# Patient Record
Sex: FEMALE | Race: WHITE
[De-identification: names, ages, dates, MRNs, and addresses within clinical notes are randomized per-mention and may not be internally consistent; named-entity substitution may affect disease eponyms.]

---

## 2020-04-10 ENCOUNTER — HOSPITAL ENCOUNTER (OUTPATIENT)
Dept: HOSPITAL 95 - LAB | Age: 75
End: 2020-04-10
Attending: NURSE PRACTITIONER
Payer: MEDICARE

## 2020-04-10 DIAGNOSIS — R30.9: Primary | ICD-10-CM

## 2020-04-10 LAB
LEUKOCYTE ESTERASE UR QL STRIP: (no result)
RBC #/AREA URNS HPF: (no result) /HPF (ref 0–2)
SP GR SPEC: 1.01 (ref 1–1.02)
UROBILINOGEN UR STRIP-MCNC: (no result) MG/DL

## 2020-06-05 ENCOUNTER — HOSPITAL ENCOUNTER (INPATIENT)
Dept: HOSPITAL 95 - ER | Age: 75
LOS: 1 days | DRG: 329 | End: 2020-06-06
Attending: SURGERY | Admitting: SURGERY
Payer: MEDICARE

## 2020-06-05 VITALS — HEIGHT: 67.01 IN | BODY MASS INDEX: 25.92 KG/M2 | WEIGHT: 165.13 LBS

## 2020-06-05 DIAGNOSIS — K65.9: ICD-10-CM

## 2020-06-05 DIAGNOSIS — A41.9: ICD-10-CM

## 2020-06-05 DIAGNOSIS — I50.30: ICD-10-CM

## 2020-06-05 DIAGNOSIS — I95.9: ICD-10-CM

## 2020-06-05 DIAGNOSIS — Z20.828: ICD-10-CM

## 2020-06-05 DIAGNOSIS — M54.5: ICD-10-CM

## 2020-06-05 DIAGNOSIS — E78.5: ICD-10-CM

## 2020-06-05 DIAGNOSIS — I13.0: ICD-10-CM

## 2020-06-05 DIAGNOSIS — N17.9: ICD-10-CM

## 2020-06-05 DIAGNOSIS — E11.22: ICD-10-CM

## 2020-06-05 DIAGNOSIS — M81.0: ICD-10-CM

## 2020-06-05 DIAGNOSIS — N18.3: ICD-10-CM

## 2020-06-05 DIAGNOSIS — I25.9: ICD-10-CM

## 2020-06-05 DIAGNOSIS — F32.9: ICD-10-CM

## 2020-06-05 DIAGNOSIS — E03.9: ICD-10-CM

## 2020-06-05 DIAGNOSIS — D72.819: ICD-10-CM

## 2020-06-05 DIAGNOSIS — R65.21: ICD-10-CM

## 2020-06-05 DIAGNOSIS — J96.01: ICD-10-CM

## 2020-06-05 DIAGNOSIS — K63.1: Primary | ICD-10-CM

## 2020-06-05 DIAGNOSIS — Z79.4: ICD-10-CM

## 2020-06-05 LAB
ALBUMIN SERPL BCP-MCNC: 1.6 G/DL (ref 3.4–5)
ALBUMIN SERPL BCP-MCNC: 3 G/DL (ref 3.4–5)
ALBUMIN/GLOB SERPL: 0.4 {RATIO} (ref 0.8–1.8)
ALBUMIN/GLOB SERPL: 0.5 {RATIO} (ref 0.8–1.8)
ALT SERPL W P-5'-P-CCNC: 21 U/L (ref 12–78)
ALT SERPL W P-5'-P-CCNC: 26 U/L (ref 12–78)
ANION GAP SERPL CALCULATED.4IONS-SCNC: 8 MMOL/L (ref 6–16)
ANION GAP SERPL CALCULATED.4IONS-SCNC: 9 MMOL/L (ref 6–16)
AST SERPL W P-5'-P-CCNC: 27 U/L (ref 12–37)
AST SERPL W P-5'-P-CCNC: 29 U/L (ref 12–37)
BASOPHILS # BLD: 0 K/MM3 (ref 0–0.23)
BASOPHILS # BLD: 0 K/MM3 (ref 0–0.23)
BASOPHILS NFR BLD: 0 % (ref 0–2)
BASOPHILS NFR BLD: 0 % (ref 0–2)
BILIRUB SERPL-MCNC: 0.2 MG/DL (ref 0.1–1)
BILIRUB SERPL-MCNC: 0.2 MG/DL (ref 0.1–1)
BUN SERPL-MCNC: 25 MG/DL (ref 8–24)
BUN SERPL-MCNC: 26 MG/DL (ref 8–24)
CALCIUM SERPL-MCNC: 10.1 MG/DL (ref 8.5–10.1)
CALCIUM SERPL-MCNC: 8.4 MG/DL (ref 8.5–10.1)
CHLORIDE SERPL-SCNC: 113 MMOL/L (ref 98–108)
CHLORIDE SERPL-SCNC: 115 MMOL/L (ref 98–108)
CO2 SERPL-SCNC: 20 MMOL/L (ref 21–32)
CO2 SERPL-SCNC: 21 MMOL/L (ref 21–32)
CREAT SERPL-MCNC: 1.01 MG/DL (ref 0.4–1)
CREAT SERPL-MCNC: 1.51 MG/DL (ref 0.4–1)
DEPRECATED RDW RBC AUTO: 62.7 FL (ref 35.1–46.3)
DEPRECATED RDW RBC AUTO: 67.2 FL (ref 35.1–46.3)
EOSINOPHIL # BLD: 0.01 K/MM3 (ref 0–0.68)
EOSINOPHIL # BLD: 0.02 K/MM3 (ref 0–0.68)
EOSINOPHIL NFR BLD: 1 % (ref 0–6)
EOSINOPHIL NFR BLD: 1 % (ref 0–6)
ERYTHROCYTE [DISTWIDTH] IN BLOOD BY AUTOMATED COUNT: 15.6 % (ref 11.7–14.2)
ERYTHROCYTE [DISTWIDTH] IN BLOOD BY AUTOMATED COUNT: 16.4 % (ref 11.7–14.2)
GLOBULIN SER CALC-MCNC: 3.6 G/DL (ref 2.2–4)
GLOBULIN SER CALC-MCNC: 5.8 G/DL (ref 2.2–4)
GLUCOSE SERPL-MCNC: 109 MG/DL (ref 70–99)
GLUCOSE SERPL-MCNC: 164 MG/DL (ref 70–99)
HCT VFR BLD AUTO: 38.6 % (ref 33–51)
HCT VFR BLD AUTO: 44.1 % (ref 33–51)
HGB BLD-MCNC: 12 G/DL (ref 11.5–16)
HGB BLD-MCNC: 13.8 G/DL (ref 11.5–16)
LYMPHOCYTES # BLD: 0.62 K/MM3 (ref 0.84–5.2)
LYMPHOCYTES # BLD: 0.95 K/MM3 (ref 0.84–5.2)
LYMPHOCYTES NFR BLD: 36 % (ref 21–46)
LYMPHOCYTES NFR BLD: 38 % (ref 21–46)
MAGNESIUM SERPL-MCNC: 1.6 MG/DL (ref 1.6–2.4)
MCHC RBC AUTO-ENTMCNC: 31.1 G/DL (ref 31.5–36.5)
MCHC RBC AUTO-ENTMCNC: 31.3 G/DL (ref 31.5–36.5)
MCV RBC AUTO: 107 FL (ref 80–100)
MCV RBC AUTO: 109 FL (ref 80–100)
METAMYELOCYTES # BLD MANUAL: 0.4 K/MM3 (ref 0–0)
METAMYELOCYTES NFR BLD MANUAL: 26 % (ref 0–0)
MONOCYTES # BLD: 0.07 K/MM3 (ref 0.16–1.47)
MONOCYTES # BLD: 0.1 K/MM3 (ref 0.16–1.47)
MONOCYTES NFR BLD: 4 % (ref 4–13)
MONOCYTES NFR BLD: 5 % (ref 4–13)
NEUTS BAND NFR BLD MANUAL: 18 % (ref 0–8)
NEUTS BAND NFR BLD MANUAL: 21 % (ref 0–8)
NEUTS SEG # BLD MANUAL: 0.43 K/MM3 (ref 1.96–9.15)
NEUTS SEG # BLD MANUAL: 1.55 K/MM3 (ref 1.96–9.15)
NEUTS SEG NFR BLD MANUAL: 41 % (ref 41–73)
NEUTS SEG NFR BLD MANUAL: 7 % (ref 41–73)
NRBC # BLD AUTO: 0 K/MM3 (ref 0–0.02)
NRBC # BLD AUTO: 0.04 K/MM3 (ref 0–0.02)
NRBC BLD AUTO-RTO: 0 /100 WBC (ref 0–0.2)
NRBC BLD AUTO-RTO: 2.6 /100 WBC (ref 0–0.2)
PH BLDA: 7.14 [PH] (ref 7.35–7.45)
PHOSPHATE SERPL-MCNC: 5 MG/DL (ref 2.5–4.9)
PLATELET # BLD AUTO: 201 K/MM3 (ref 150–400)
PLATELET # BLD AUTO: 218 K/MM3 (ref 150–400)
POTASSIUM SERPL-SCNC: 3.9 MMOL/L (ref 3.5–5.5)
POTASSIUM SERPL-SCNC: 4.3 MMOL/L (ref 3.5–5.5)
PROT SERPL-MCNC: 5.2 G/DL (ref 6.4–8.2)
PROT SERPL-MCNC: 8.8 G/DL (ref 6.4–8.2)
PROT UR STRIP-MCNC: (no result) MG/DL
PROTHROMBIN TIME: 12.7 SEC (ref 9.7–11.5)
RBC #/AREA URNS HPF: (no result) /HPF (ref 0–2)
SODIUM SERPL-SCNC: 142 MMOL/L (ref 136–145)
SODIUM SERPL-SCNC: 144 MMOL/L (ref 136–145)
SP GR SPEC: 1.01 (ref 1–1.02)
TOTAL CELLS COUNTED BLD: 100
TOTAL CELLS COUNTED BLD: 100
UROBILINOGEN UR STRIP-MCNC: (no result) MG/DL
VARIANT LYMPHS NFR BLD MANUAL: 2 % (ref 0–0)
WBC #/AREA URNS HPF: (no result) /HPF (ref 0–5)

## 2020-06-05 PROCEDURE — 3E033XZ INTRODUCTION OF VASOPRESSOR INTO PERIPHERAL VEIN, PERCUTANEOUS APPROACH: ICD-10-PCS | Performed by: SURGERY

## 2020-06-05 PROCEDURE — C1751 CATH, INF, PER/CENT/MIDLINE: HCPCS

## 2020-06-05 PROCEDURE — C9113 INJ PANTOPRAZOLE SODIUM, VIA: HCPCS

## 2020-06-05 PROCEDURE — 0DTN0ZZ RESECTION OF SIGMOID COLON, OPEN APPROACH: ICD-10-PCS | Performed by: SURGERY

## 2020-06-05 PROCEDURE — U0002 COVID-19 LAB TEST NON-CDC: HCPCS

## 2020-06-05 PROCEDURE — 5A1935Z RESPIRATORY VENTILATION, LESS THAN 24 CONSECUTIVE HOURS: ICD-10-PCS | Performed by: SURGERY

## 2020-06-05 PROCEDURE — 0D1N0Z4 BYPASS SIGMOID COLON TO CUTANEOUS, OPEN APPROACH: ICD-10-PCS | Performed by: SURGERY

## 2020-06-05 PROCEDURE — 0BH17EZ INSERTION OF ENDOTRACHEAL AIRWAY INTO TRACHEA, VIA NATURAL OR ARTIFICIAL OPENING: ICD-10-PCS | Performed by: SURGERY

## 2020-06-05 NOTE — NUR
DR LAGOS CALLED, REPORTS WILL CALL FAMILY.  REPORTS TO INCREASE IVF RATE
/HR WITH CURRENT BAG IN PLACE AT THIS TIME AND THAT HE WILL BE PLACING
ORDERS.

## 2020-06-05 NOTE — NUR
SHIFT SUMMARY
 
RECEIVED HAND OFF FROM FRANCISCO JAVIER NICHOLS USING SBAR.  TRANSPORTED TO ROOM 217 VIA
STRETCHER.  TRANSFERED TO BED WITH FULL STAFF AASSISTANCE, TOLERATED WELL.
DURING TRANSFER TO BED RIGHT HAND 22G PIV BECAME TWISTED BUT REMAINED
PATENT.  AAO X3, BUT QUICKLY BECOMES DROWSY, SMITH, FOLLOWS ALL COMMANDS.
ORIENTED TO ROOM, CALL SYSTEM, AND POC, VOICES UNDERSTANDING.  STATED THAT SHE
NEEDS TO USE COMMODE, D/T WEAKNESS OFFEERED BEDPAN.  GAVE VERBAL PERMISSION
FOR NURSING TO SPEAK WITH AND UPDATE GABRIELLA ESTEBAN.  WHEN ASKED ABOUT
PMHX AND PSHX NIBILLY STATES THAT THE BEST INFO WOULD COME FROM DAUGHTER WHO IS
ON HER WAY DOWN FROM [place].  ZOSYN STARTED IN ER COMPLETED.  NS STARTED AT
50ML/HR PER MD ORDERS.  C/O PAIN 8/10, GIVEN IV PAIN MEDS PER MD ORDERS.
CURRENTLY ORDERED NPO.  C/O DRY MOUTH, GIVEN GLYCERINE SWABS.  DENIES FURTHER
NEEDS OR WANTS AT THIS TIME.  SPOKE WITH PT'S SON IBETH DORAN [phone]
[phone] AND HER DAUGHTER JERI LÓPEZ [phone] AND GAVE UPDATES AND
CLARIFED PMHX AND PSHX.  SAFETY MEASURES IN PLACE.  WILL CONTINUE TO MONITOR
AND GIVE HAND OFF TO ONCOMING SHIFT USING SBAR DURING BEDSIDE REPORT.

## 2020-06-05 NOTE — NUR
DR LAGOS:
DR LAGOS AND DR LLOYD CONSULT AT BEDSIDE. ORDER TO CONSULT HOSPITALIST
ORDERED. ER STATES DR JONES IS NEXT UP FOR ADMIT. CALLED DR JONES AND SHE CAME
TO ASSESS PT.

## 2020-06-05 NOTE — NUR
CENTRAL LINE:
DR LLOYD PLACED CENTRAL LINE D/T INCREASED NEED FOR LEVOPHED. WILL CONTINUE
TO MONITOR AND ASSESS FURTHER.

## 2020-06-05 NOTE — NUR
DR LAGOS HERE RECENTLY, REPORTED TO HAVE CT STAT, HS RN REPORTED TO NOTIFY
IMAGING. PT NOW BACK FROM HAVING CT, ALARM IN PLACE. IVF PLACED.

## 2020-06-05 NOTE — NUR
06/05/20 Daisy9 Briana Jackson A
COLOSTOMY AND IVANA DRAIN PLACED. PATIENT TO GO ICU 5, WITH VENT FOR
RECOVERY.

## 2020-06-05 NOTE — NUR
PT REPORTED TO BE GOING TO ICU 5 AFTER SURGERY, REPORT BEEN GIVEN TO OTHER RN
SHARITA WHO WILL TAKING OVER CARE. BELONGINGS SENT TO ICU 5.

## 2020-06-05 NOTE — NUR
ASSUMED CARE:
RECEIVED REPORT FROM SURGICAL RN AT 1100.
RECEIVED OR REPORT FROM OR RN AT 1232
NOTIFIED DR LLOYD @ 1233 OF PT COMING INTUBATED
PT ARIVED TO THE FLOOR AT 1300 WITH RN'S AND DR WILSON ANESTHESIOLOGIST AT
BEDSIDE. DR WILSON BAGGING PT UPON ARIVAL. PT APPEARS VERY PAIL AND GRAY. ET
TUBE IS NOTED TO BE 7.5 TUBE AND IS 23 AT THE GUMS. DR WILSON REPORTS
LEVOPHED IS RUNNING AT 8, SECOND AND THIRD BAG OF LR IS HANGING WO, BUT MOSTLY
FULL. DIFFICULT AQUIRING A PULSE OXYMETER READINGS.  PT TEMP IS NOTED TO BE
96.6 WARM BLANKETS PLACED OVER PT AND L HAND PLACED IN WARM BLANKET TO ATTEMPT
TO WARM FINGERS TO ATEMPT TO GET O2 READING. ABG AQUIRED. CHEST X-RAY ORDERED
FOR TUBE PLACEMENT. RESTRAINTS PLACED AT 1335.

## 2020-06-05 NOTE — NUR
UPDATE & CENTRAL LINE:
PROPOFOL STARTED AT 5 MCG/KG/MIN AT 1426, BP STARTED TO DECREASE AND LEVOPHED
WAS STARTED AT 1430 2 MCG/MIN. SEE FLOW SHEET TO FOLLOW TITRATION TREND.

## 2020-06-05 NOTE — NUR
OTHER RN BEEN IN ROOM ASSISTING WITH GETTING PT READY FOR SURGERY, REPORTS
TALKING TO DR LAGOS RELATED TO PT'S BP BEING LOW AND IS GIVING BOLUS. DR LAGOS RECENTLY BACK TO ROOM. OTHER RN BEEN IN ROOM. ANESTHESIA TO SEE PT.

## 2020-06-05 NOTE — NUR
PT INTUBATED AND SEDATED WITH PROPOFOL. S/P COLECTOMY WITH MIDLINE INCISION,
IVANA DRAIN, GOOD SUCTION.OSTOMY APPLIANCE L UPPER QUADRANT. NO OUTPUT.
LEVOPHED IS BEING TITRATED FOR BP SUPPORT. DR LLOYD CONTACTED PER RT REQUEST
TO PULL OUT ET TUBE 2CM TO 21 AT THE LIP. RT AT BEDSIDE TO COMPLETE PROCEDURE.
BILATERAL LOWER EXTREMITIES ARE COOL TO THE TOUCH. DOPPLER PULSE ON R PEDAL,
UNABLE TO FIND L PEDAL PULSE W MULTIPLE RN'S. GOOD CAPILLARY REFILL BILATERAL
LOWER EXTREMITY. WARM BLANKETS PLACED ON LOWER EXTREMITIES.

## 2020-06-06 LAB
ALBUMIN SERPL BCP-MCNC: 1.3 G/DL (ref 3.4–5)
ALBUMIN/GLOB SERPL: 0.4 {RATIO} (ref 0.8–1.8)
ALT SERPL W P-5'-P-CCNC: 23 U/L (ref 12–78)
ANION GAP SERPL CALCULATED.4IONS-SCNC: 12 MMOL/L (ref 6–16)
ANION GAP SERPL CALCULATED.4IONS-SCNC: 13 MMOL/L (ref 6–16)
AST SERPL W P-5'-P-CCNC: 45 U/L (ref 12–37)
BASOPHILS # BLD: 0 K/MM3 (ref 0–0.23)
BASOPHILS NFR BLD: 0 % (ref 0–2)
BILIRUB SERPL-MCNC: 0.2 MG/DL (ref 0.1–1)
BUN SERPL-MCNC: 28 MG/DL (ref 8–24)
BUN SERPL-MCNC: 32 MG/DL (ref 8–24)
CALCIUM SERPL-MCNC: 7.2 MG/DL (ref 8.5–10.1)
CALCIUM SERPL-MCNC: 7.5 MG/DL (ref 8.5–10.1)
CHLORIDE SERPL-SCNC: 109 MMOL/L (ref 98–108)
CHLORIDE SERPL-SCNC: 113 MMOL/L (ref 98–108)
CO2 SERPL-SCNC: 16 MMOL/L (ref 21–32)
CO2 SERPL-SCNC: 16 MMOL/L (ref 21–32)
CREAT SERPL-MCNC: 1.52 MG/DL (ref 0.4–1)
CREAT SERPL-MCNC: 1.66 MG/DL (ref 0.4–1)
DEPRECATED RDW RBC AUTO: 65.1 FL (ref 35.1–46.3)
EOSINOPHIL # BLD: 0 K/MM3 (ref 0–0.68)
EOSINOPHIL NFR BLD: 0 % (ref 0–6)
ERYTHROCYTE [DISTWIDTH] IN BLOOD BY AUTOMATED COUNT: 16.5 % (ref 11.7–14.2)
GLOBULIN SER CALC-MCNC: 3.2 G/DL (ref 2.2–4)
GLUCOSE SERPL-MCNC: 107 MG/DL (ref 70–99)
GLUCOSE SERPL-MCNC: 74 MG/DL (ref 70–99)
HCT VFR BLD AUTO: 33.1 % (ref 33–51)
HCT VFR BLD AUTO: 33.4 % (ref 33–51)
HGB BLD-MCNC: 10.7 G/DL (ref 11.5–16)
HGB BLD-MCNC: 10.9 G/DL (ref 11.5–16)
LYMPHOCYTES # BLD: 0.72 K/MM3 (ref 0.84–5.2)
LYMPHOCYTES NFR BLD: 19 % (ref 21–46)
MAGNESIUM SERPL-MCNC: 1.8 MG/DL (ref 1.6–2.4)
MAGNESIUM SERPL-MCNC: 1.8 MG/DL (ref 1.6–2.4)
MCHC RBC AUTO-ENTMCNC: 32 G/DL (ref 31.5–36.5)
MCV RBC AUTO: 107 FL (ref 80–100)
METAMYELOCYTES # BLD MANUAL: 0.11 K/MM3 (ref 0–0)
METAMYELOCYTES NFR BLD MANUAL: 3 % (ref 0–0)
MONOCYTES # BLD: 0.15 K/MM3 (ref 0.16–1.47)
MONOCYTES NFR BLD: 4 % (ref 4–13)
NEUTS BAND NFR BLD MANUAL: 37 % (ref 0–8)
NEUTS SEG # BLD MANUAL: 2.83 K/MM3 (ref 1.96–9.15)
NEUTS SEG NFR BLD MANUAL: 37 % (ref 41–73)
NRBC # BLD AUTO: 0.04 K/MM3 (ref 0–0.02)
NRBC BLD AUTO-RTO: 1 /100 WBC (ref 0–0.2)
PH BLDA: 7.34 [PH] (ref 7.35–7.45)
PHOSPHATE SERPL-MCNC: 3.6 MG/DL (ref 2.5–4.9)
PHOSPHATE SERPL-MCNC: 4.6 MG/DL (ref 2.5–4.9)
PLATELET # BLD AUTO: 148 K/MM3 (ref 150–400)
POTASSIUM SERPL-SCNC: 3.9 MMOL/L (ref 3.5–5.5)
POTASSIUM SERPL-SCNC: 4.7 MMOL/L (ref 3.5–5.5)
PROT SERPL-MCNC: 4.5 G/DL (ref 6.4–8.2)
SODIUM SERPL-SCNC: 137 MMOL/L (ref 136–145)
SODIUM SERPL-SCNC: 142 MMOL/L (ref 136–145)
TOTAL CELLS COUNTED BLD: 100
TROPONIN I SERPL-MCNC: 23.9 NG/ML (ref 0–0.04)

## 2020-06-06 NOTE — NUR
AFTER LEVOPHED WAS INCREASED AND NEOSYNEPHRINE WAS STARTED, PTS PULSE BECAME
IRREGULAR AND QUICKLY TRANSITIONED INTO V-TACH. NO PERIPHERAL OR CAROTID
PULSES FOUND. NO APICAL PULSE. FAMILY AT BEDSIDE DURING PASSING AND WANTED TO
FOLLOW THROUGH WITH DNR STATUS. RT STOPPED VENT. OFFERED SUPPORT FOR FAMILY.
FAMILY REQUESTED ALONE TIME WITH PT. PASTORAL CARE CALLED PER FAMILY REQUEST.

## 2020-06-06 NOTE — NUR
review of pt with nursing pt family at this time want to maintain full code.
pt starting heaprin drip will review prognosis with physician. expenisve
history will review goals of care.

## 2020-06-06 NOTE — NUR
Shift Summary:
 
Patient's condition worsened throughout shift. Duplex study of lt leg obtain
this morning, results made aware to Dr. Faith. Duplex study showed
significant lack of and complete loss of arterial blood flow to lt leg.
Modeling also noted to lt foot mid-morning, Dr. Faith also made aware. No
new order's received at that time, awaiting Echocardiogram and repeat labs
scheduled for 1600hr. BP remained labile throughout shift. Levohped titrated
down to 10mcg/min this morning, then back up to 16mcg/min this afternoon.
 
Call then placed Dr. Faith with 1600 lab results and informed levophed at
been titrated back up to 16mcg/min. Troponin and lactic acid continued to
elevate/increase. Dr. Faith then instructed he would call Dr. Fitch for
consult. Dr. Fitch then to room to assess patient. Received orders per Dr. Fitch for IV heparin bolus and gtt, PT plavix and ASA. Also informed that
patient is not a likely candidate for interventions to lt leg perfusion r/t
entire clinical picture.
 
Received additional call from Dr. Faith, informed he had updated patient's
daughter "Taniya" on patient's condition. Also no new orders received from
Dr. Faith. Taniya then called this RN, permission received given for Taniya
and her brother to come visit patient, in route from [place] and this time.
 
Bedside report given to [facility] shift RN. Levophed gtt remains at 16mcg/min,
systolic BP low 90's- low 100's. No change to colostomy or appliance
throughout shift, minimal output of serous drainage in bed. Mid-line ABD
wound vac remains intact.

## 2020-06-06 NOTE — NUR
ASSUMED CARE OF PATIENT AT 1900. PT INTUBATED AND SEDATED WITH PROPOFOL. PT
DOES NOT RESPOND TO VERBAL OR PAINFUL STIMULI AT THIS TIME. NO SPONTANEOUS
MOVEMENT OF EXTREMITIES. LEFT LEG COLD AND MOTTLED DISTAL TO THE KNEE. UNABLE
TO OBTAIN A PULSE WITH DOPPLER. MOTTLING BILATERALLY ON THE BREASTS. STOMA
COLOR DULL PINK IN COMPARISON TO PREVIOUS NIGHT SHIFT.
GRANDAUGHTER PRESENT SHORTLY AFTER SHIFT CHANGE.

## 2020-06-06 NOTE — NUR
SUMMARY
 
PT INTUBATED AND SEDATED. GRIMACES TO PAINFUL STIMULI. DURING SEDATION
VACATION PT BECAME TACHYCARDIC, ANXIOUS, AND NOT FOLLOWING COMMANDS. PT WAS
PLACED BACK ON SEDATION BEFORE SBT COULD BE PERFORMED. LEVOPHED WAS TITRATED
UP FROM 10MCG/MIN TO 16MCG/MIN. DR LLOYD WAS CALLED DUE TO MAP BEING <60.
NEW ORDERS FOR LR BOLUS AND VASOPRESSIN. PT RESPONDED WELL TO THE BOLUS AND
VASOPRESSIN. DR LLOYD WAS UPDATED ABOUT THE LACTIC ACID UP TO 7.9. NO NEW
ORDERS, HE WOULD REVIEW TODAY WHEN HE COMES IN. IVANA VAC HAS ADEQUATE SUCTION
WITH SMALL AMOUNT OF SEROSANGUINEOUS AT THE BOTTOM OF THE DRESSING. NO OUTPUT
FROM THE OSTOMY. NO OTHER CHANGES.

## 2020-06-06 NOTE — NUR
HAVING DIFFICULTY OBTAINING BLOOD PRESSURE, ONLY MEAN. ALSO HAVING DIFFICULTY
OBTAINING PULSE OXIMETRY. CALLED DR. LLOYD NEW ORDERS FOR NEOSYNEPHRINE AS
WELL AS VERBAL ORDER TO INCREASE LEVOPHED TO 40MCG/MIN IF NEEDED. FAMILY
REMAINS AT BEDSIDE AND UPDATED. PT APPEARS TO BE MORE MOTTLED AT THE NECK AND
UNABLE TO PALPATE RADIAL PULSE. PT DOES HAVE AN APICAL PULSE AT THIS TIME.
'S ON THE MONITOR.

## 2020-06-06 NOTE — NUR
[place]una of Care:
 
Care assumed at 0700hr. Patient intubated and sedated, with propofol gtt at
20mcg/kg/min. Patient not responding to verbal stimuli, but facial grimace to
painful stimuli. Gross/spontaneous movement of rt arm and rt leg. Withdraws to
painful stimuli to bilateral arms and rt leg, but absent of movement or
reflexes to lt leg. Lt foot also slightly cooler to touch than rt leg, and
unable to find pulses to lt foot. deficit's to lt leg discussed with Dr. Nicholson
while in room. Received orders for Duplex study of lt leg, rectal ASA, and
plan for CT scan (rule out CVA) when patient is more stable (off pressors).
Will also discuss plan with Dr. Faith this morning.
 
Vent to AC-16/400/5/40%, spO2-97%, tolerating vent mode without difficulty.
Levophed gtt decreased from 16mcg/min to 14mcg/min at shift change,
vasopressin gtt also infusing. Systolic -130, will continue to monitor
and titrate as indicated. Vega cath patent and intact, draining small amount
of dark yellow urine. Central line to rt groin patent and intact, infusing
without difficulty. Will continue to monitor.

## 2020-06-07 NOTE — NUR
Pastoral care visit conducted. Met with family who where in the room at the
bedside. Family were tearful and requested prayer. Consolatory prayer,
pastoral presence, and condolences offered. Family requested time alone
subsequently. I will remain available for further PC support.